# Patient Record
Sex: MALE | Race: WHITE | Employment: FULL TIME | URBAN - NONMETROPOLITAN AREA
[De-identification: names, ages, dates, MRNs, and addresses within clinical notes are randomized per-mention and may not be internally consistent; named-entity substitution may affect disease eponyms.]

---

## 2017-07-23 ENCOUNTER — OFFICE VISIT (OUTPATIENT)
Dept: PRIMARY CARE CLINIC | Age: 62
End: 2017-07-23
Payer: COMMERCIAL

## 2017-07-23 VITALS
HEART RATE: 73 BPM | WEIGHT: 167 LBS | OXYGEN SATURATION: 98 % | TEMPERATURE: 98.3 F | BODY MASS INDEX: 22.65 KG/M2 | DIASTOLIC BLOOD PRESSURE: 70 MMHG | SYSTOLIC BLOOD PRESSURE: 116 MMHG

## 2017-07-23 DIAGNOSIS — Z91.09 ENVIRONMENTAL ALLERGIES: ICD-10-CM

## 2017-07-23 DIAGNOSIS — J32.9 VIRAL SINUSITIS: Primary | ICD-10-CM

## 2017-07-23 DIAGNOSIS — I48.20 CHRONIC ATRIAL FIBRILLATION (HCC): ICD-10-CM

## 2017-07-23 DIAGNOSIS — B97.89 VIRAL SINUSITIS: Primary | ICD-10-CM

## 2017-07-23 PROCEDURE — 99214 OFFICE O/P EST MOD 30 MIN: CPT | Performed by: NURSE PRACTITIONER

## 2017-07-23 RX ORDER — PREDNISONE 50 MG/1
50 TABLET ORAL DAILY
Qty: 5 TABLET | Refills: 0 | Status: SHIPPED | OUTPATIENT
Start: 2017-07-23 | End: 2017-07-28

## 2017-07-23 ASSESSMENT — ENCOUNTER SYMPTOMS
SINUS PRESSURE: 1
SORE THROAT: 0
CHEST TIGHTNESS: 0
EYES NEGATIVE: 1
ABDOMINAL PAIN: 0
NAUSEA: 0
COUGH: 0
ALLERGIC/IMMUNOLOGIC NEGATIVE: 1
RESPIRATORY NEGATIVE: 1
GASTROINTESTINAL NEGATIVE: 1
RHINORRHEA: 0
VOMITING: 0
DIARRHEA: 0
CONSTIPATION: 0
TROUBLE SWALLOWING: 0
SHORTNESS OF BREATH: 0

## 2018-09-02 ENCOUNTER — HOSPITAL ENCOUNTER (OUTPATIENT)
Age: 63
Discharge: HOME | End: 2018-09-04
Payer: COMMERCIAL

## 2018-09-02 ENCOUNTER — OFFICE VISIT (OUTPATIENT)
Dept: PRIMARY CARE CLINIC | Age: 63
End: 2018-09-02
Payer: COMMERCIAL

## 2018-09-02 ENCOUNTER — HOSPITAL ENCOUNTER (OUTPATIENT)
Dept: GENERAL RADIOLOGY | Age: 63
Discharge: HOME | End: 2018-09-04
Payer: COMMERCIAL

## 2018-09-02 VITALS
SYSTOLIC BLOOD PRESSURE: 132 MMHG | RESPIRATION RATE: 16 BRPM | DIASTOLIC BLOOD PRESSURE: 80 MMHG | WEIGHT: 169 LBS | OXYGEN SATURATION: 98 % | HEART RATE: 68 BPM | TEMPERATURE: 97.6 F | BODY MASS INDEX: 22.89 KG/M2 | HEIGHT: 72 IN

## 2018-09-02 DIAGNOSIS — S83.92XA SPRAIN OF LEFT KNEE, UNSPECIFIED LIGAMENT, INITIAL ENCOUNTER: Primary | ICD-10-CM

## 2018-09-02 DIAGNOSIS — M25.562 ACUTE PAIN OF LEFT KNEE: ICD-10-CM

## 2018-09-02 PROCEDURE — G8427 DOCREV CUR MEDS BY ELIG CLIN: HCPCS | Performed by: FAMILY MEDICINE

## 2018-09-02 PROCEDURE — 1036F TOBACCO NON-USER: CPT | Performed by: FAMILY MEDICINE

## 2018-09-02 PROCEDURE — G8420 CALC BMI NORM PARAMETERS: HCPCS | Performed by: FAMILY MEDICINE

## 2018-09-02 PROCEDURE — 3017F COLORECTAL CA SCREEN DOC REV: CPT | Performed by: FAMILY MEDICINE

## 2018-09-02 PROCEDURE — 73562 X-RAY EXAM OF KNEE 3: CPT

## 2018-09-02 PROCEDURE — 99214 OFFICE O/P EST MOD 30 MIN: CPT | Performed by: FAMILY MEDICINE

## 2018-09-02 RX ORDER — ASPIRIN 325 MG
325 TABLET ORAL
COMMUNITY

## 2018-09-02 RX ORDER — IBUPROFEN 800 MG/1
800 TABLET ORAL EVERY 8 HOURS PRN
Qty: 60 TABLET | Refills: 0 | Status: SHIPPED | OUTPATIENT
Start: 2018-09-02

## 2018-09-02 ASSESSMENT — PATIENT HEALTH QUESTIONNAIRE - PHQ9
2. FEELING DOWN, DEPRESSED OR HOPELESS: 0
SUM OF ALL RESPONSES TO PHQ QUESTIONS 1-9: 0
1. LITTLE INTEREST OR PLEASURE IN DOING THINGS: 0
SUM OF ALL RESPONSES TO PHQ9 QUESTIONS 1 & 2: 0
SUM OF ALL RESPONSES TO PHQ QUESTIONS 1-9: 0

## 2018-09-12 ENCOUNTER — OFFICE VISIT (OUTPATIENT)
Dept: ORTHOPEDIC SURGERY | Age: 63
End: 2018-09-12
Payer: COMMERCIAL

## 2018-09-12 VITALS
DIASTOLIC BLOOD PRESSURE: 63 MMHG | HEIGHT: 72 IN | HEART RATE: 73 BPM | WEIGHT: 169 LBS | SYSTOLIC BLOOD PRESSURE: 130 MMHG | BODY MASS INDEX: 22.89 KG/M2

## 2018-09-12 DIAGNOSIS — M76.52 PATELLAR TENDINITIS OF LEFT KNEE: Primary | ICD-10-CM

## 2018-09-12 PROCEDURE — 1036F TOBACCO NON-USER: CPT | Performed by: FAMILY MEDICINE

## 2018-09-12 PROCEDURE — G8420 CALC BMI NORM PARAMETERS: HCPCS | Performed by: FAMILY MEDICINE

## 2018-09-12 PROCEDURE — 99203 OFFICE O/P NEW LOW 30 MIN: CPT | Performed by: FAMILY MEDICINE

## 2018-09-12 PROCEDURE — MISCD377 PATELLAR TENDON STRAP-BREG: Performed by: FAMILY MEDICINE

## 2018-09-12 PROCEDURE — G8427 DOCREV CUR MEDS BY ELIG CLIN: HCPCS | Performed by: FAMILY MEDICINE

## 2018-09-12 PROCEDURE — 3017F COLORECTAL CA SCREEN DOC REV: CPT | Performed by: FAMILY MEDICINE

## 2018-09-12 NOTE — PROGRESS NOTES
physical therapy does not wish to participate not just yet however we will get him a Cho-Pat strap for stability for the patellar tendon and see him back otherwise as needed if he does wish to participate in physical therapy we will gladly write him a prescription without him having coming back to see us    Return to clinic in No Follow-up on file. Rabia Beltran Please be aware portions of this note were completed using voice recognition software and unforeseen errors may have occurred    Electronically signed by Fredy Brothers DO, FAOASM  on 9/12/18 at 1:39 PM        No orders of the defined types were placed in this encounter.

## 2019-01-20 ENCOUNTER — OFFICE VISIT (OUTPATIENT)
Dept: PRIMARY CARE CLINIC | Age: 64
End: 2019-01-20
Payer: COMMERCIAL

## 2019-01-20 VITALS
TEMPERATURE: 96.9 F | HEIGHT: 72 IN | BODY MASS INDEX: 23.62 KG/M2 | OXYGEN SATURATION: 98 % | HEART RATE: 70 BPM | DIASTOLIC BLOOD PRESSURE: 88 MMHG | SYSTOLIC BLOOD PRESSURE: 132 MMHG | RESPIRATION RATE: 16 BRPM | WEIGHT: 174.4 LBS

## 2019-01-20 DIAGNOSIS — S61.012A LACERATION OF LEFT THUMB WITHOUT FOREIGN BODY WITHOUT DAMAGE TO NAIL, INITIAL ENCOUNTER: Primary | ICD-10-CM

## 2019-01-20 PROCEDURE — G8427 DOCREV CUR MEDS BY ELIG CLIN: HCPCS | Performed by: FAMILY MEDICINE

## 2019-01-20 PROCEDURE — G8420 CALC BMI NORM PARAMETERS: HCPCS | Performed by: FAMILY MEDICINE

## 2019-01-20 PROCEDURE — 1036F TOBACCO NON-USER: CPT | Performed by: FAMILY MEDICINE

## 2019-01-20 PROCEDURE — 99213 OFFICE O/P EST LOW 20 MIN: CPT | Performed by: FAMILY MEDICINE

## 2019-01-20 PROCEDURE — G8484 FLU IMMUNIZE NO ADMIN: HCPCS | Performed by: FAMILY MEDICINE

## 2019-01-20 PROCEDURE — 3017F COLORECTAL CA SCREEN DOC REV: CPT | Performed by: FAMILY MEDICINE

## 2019-01-20 RX ORDER — TRAMADOL HYDROCHLORIDE 50 MG/1
50 TABLET ORAL EVERY 8 HOURS PRN
Qty: 10 TABLET | Refills: 0 | Status: SHIPPED | OUTPATIENT
Start: 2019-01-20 | End: 2019-01-24

## 2019-01-20 RX ORDER — SULFAMETHOXAZOLE AND TRIMETHOPRIM 800; 160 MG/1; MG/1
1 TABLET ORAL 2 TIMES DAILY
Qty: 10 TABLET | Refills: 0 | Status: SHIPPED | OUTPATIENT
Start: 2019-01-20 | End: 2019-01-25

## 2019-01-20 RX ORDER — ACETAMINOPHEN 500 MG
500 TABLET ORAL EVERY 6 HOURS PRN
COMMUNITY
End: 2019-01-20 | Stop reason: ALTCHOICE

## 2019-01-20 ASSESSMENT — ENCOUNTER SYMPTOMS: COLOR CHANGE: 1

## 2019-09-19 ENCOUNTER — OFFICE VISIT (OUTPATIENT)
Dept: PRIMARY CARE CLINIC | Age: 64
End: 2019-09-19
Payer: COMMERCIAL

## 2019-09-19 VITALS
BODY MASS INDEX: 22.92 KG/M2 | OXYGEN SATURATION: 99 % | DIASTOLIC BLOOD PRESSURE: 80 MMHG | TEMPERATURE: 97.9 F | SYSTOLIC BLOOD PRESSURE: 138 MMHG | HEART RATE: 91 BPM | HEIGHT: 72 IN | WEIGHT: 169.2 LBS

## 2019-09-19 DIAGNOSIS — H65.01 RIGHT ACUTE SEROUS OTITIS MEDIA, RECURRENCE NOT SPECIFIED: Primary | ICD-10-CM

## 2019-09-19 PROCEDURE — 99213 OFFICE O/P EST LOW 20 MIN: CPT | Performed by: FAMILY MEDICINE

## 2019-09-19 RX ORDER — FLUTICASONE PROPIONATE 50 MCG
1 SPRAY, SUSPENSION (ML) NASAL DAILY
Qty: 1 BOTTLE | Refills: 0 | Status: SHIPPED | OUTPATIENT
Start: 2019-09-19 | End: 2022-03-03

## 2019-09-19 RX ORDER — AMOXICILLIN 875 MG/1
875 TABLET, COATED ORAL 2 TIMES DAILY
Qty: 20 TABLET | Refills: 0 | Status: SHIPPED | OUTPATIENT
Start: 2019-09-19 | End: 2019-09-29

## 2019-09-19 ASSESSMENT — PATIENT HEALTH QUESTIONNAIRE - PHQ9
SUM OF ALL RESPONSES TO PHQ QUESTIONS 1-9: 0
SUM OF ALL RESPONSES TO PHQ QUESTIONS 1-9: 0
1. LITTLE INTEREST OR PLEASURE IN DOING THINGS: 0
SUM OF ALL RESPONSES TO PHQ9 QUESTIONS 1 & 2: 0
2. FEELING DOWN, DEPRESSED OR HOPELESS: 0

## 2019-09-19 NOTE — PROGRESS NOTES
light reflex. The external auditory canals are clear bilaterally. Oropharynx has no erythema. There is no exudate. There is no tenderness over the frontal and maxillary sinuses bilaterally. Neck supple. No adenopathy. Chest:  Normal expansion. Clear to auscultation. No rales, rhonchi, or wheezing. Heart sounds are normal.  Regular rate and rhythm without murmur, gallop or rub. Assessment & Plan:    Right acute serous otitis media, recurrence not specified  - fluticasone (FLONASE) 50 MCG/ACT nasal spray; 1 spray by Nasal route daily  Dispense: 1 Bottle; Refill: 0  - amoxicillin (AMOXIL) 875 MG tablet; Take 1 tablet by mouth 2 times daily for 10 days  Dispense: 20 tablet; Refill: 0    Printed information regarding Eustachian Tube Problems was provided to the patient with his after visit summary. He was advised to follow up if symptoms worsen or do not resolve.        (Please note that portions of this note were completed with a voice-recognition program. Efforts were made to edit the dictation but occasionally words are mis-transcribed.)

## 2019-09-27 ENCOUNTER — OFFICE VISIT (OUTPATIENT)
Dept: PRIMARY CARE CLINIC | Age: 64
End: 2019-09-27
Payer: COMMERCIAL

## 2019-09-27 VITALS
BODY MASS INDEX: 23.43 KG/M2 | RESPIRATION RATE: 16 BRPM | HEART RATE: 66 BPM | TEMPERATURE: 96.9 F | DIASTOLIC BLOOD PRESSURE: 90 MMHG | HEIGHT: 72 IN | SYSTOLIC BLOOD PRESSURE: 136 MMHG | OXYGEN SATURATION: 98 % | WEIGHT: 173 LBS

## 2019-09-27 DIAGNOSIS — J01.41 ACUTE RECURRENT PANSINUSITIS: Primary | ICD-10-CM

## 2019-09-27 DIAGNOSIS — H66.004 RECURRENT ACUTE SUPPURATIVE OTITIS MEDIA OF RIGHT EAR WITHOUT SPONTANEOUS RUPTURE OF TYMPANIC MEMBRANE: ICD-10-CM

## 2019-09-27 PROCEDURE — 99213 OFFICE O/P EST LOW 20 MIN: CPT | Performed by: PHYSICIAN ASSISTANT

## 2019-09-27 RX ORDER — CIPROFLOXACIN HYDROCHLORIDE 3.5 MG/ML
SOLUTION/ DROPS TOPICAL
Qty: 5 ML | Refills: 0 | Status: SHIPPED | OUTPATIENT
Start: 2019-09-27 | End: 2022-03-03

## 2019-09-27 RX ORDER — CEFDINIR 300 MG/1
300 CAPSULE ORAL 2 TIMES DAILY
Qty: 20 CAPSULE | Refills: 0 | Status: SHIPPED | OUTPATIENT
Start: 2019-09-27 | End: 2019-10-07

## 2019-09-27 ASSESSMENT — ENCOUNTER SYMPTOMS
RESPIRATORY NEGATIVE: 1
SORE THROAT: 0
TROUBLE SWALLOWING: 0
GASTROINTESTINAL NEGATIVE: 1

## 2022-03-03 ENCOUNTER — OFFICE VISIT (OUTPATIENT)
Dept: PRIMARY CARE CLINIC | Age: 67
End: 2022-03-03
Payer: COMMERCIAL

## 2022-03-03 VITALS
TEMPERATURE: 97.1 F | HEART RATE: 83 BPM | WEIGHT: 172.5 LBS | SYSTOLIC BLOOD PRESSURE: 124 MMHG | HEIGHT: 72 IN | OXYGEN SATURATION: 98 % | DIASTOLIC BLOOD PRESSURE: 82 MMHG | BODY MASS INDEX: 23.36 KG/M2 | RESPIRATION RATE: 18 BRPM

## 2022-03-03 DIAGNOSIS — R05.9 COUGH: ICD-10-CM

## 2022-03-03 DIAGNOSIS — J01.40 ACUTE PANSINUSITIS, RECURRENCE NOT SPECIFIED: Primary | ICD-10-CM

## 2022-03-03 PROCEDURE — 99212 OFFICE O/P EST SF 10 MIN: CPT | Performed by: FAMILY MEDICINE

## 2022-03-03 PROCEDURE — 99213 OFFICE O/P EST LOW 20 MIN: CPT | Performed by: FAMILY MEDICINE

## 2022-03-03 RX ORDER — CEFDINIR 300 MG/1
300 CAPSULE ORAL 2 TIMES DAILY
Qty: 20 CAPSULE | Refills: 0 | Status: SHIPPED | OUTPATIENT
Start: 2022-03-03 | End: 2022-03-13

## 2022-03-03 RX ORDER — BENZONATATE 100 MG/1
100-200 CAPSULE ORAL 3 TIMES DAILY PRN
Qty: 30 CAPSULE | Refills: 0 | Status: SHIPPED | OUTPATIENT
Start: 2022-03-03 | End: 2022-03-10

## 2022-03-03 RX ORDER — M-VIT,TX,IRON,MINS/CALC/FOLIC 27MG-0.4MG
1 TABLET ORAL DAILY
COMMUNITY

## 2022-03-03 ASSESSMENT — PATIENT HEALTH QUESTIONNAIRE - PHQ9
SUM OF ALL RESPONSES TO PHQ QUESTIONS 1-9: 0
SUM OF ALL RESPONSES TO PHQ9 QUESTIONS 1 & 2: 0
2. FEELING DOWN, DEPRESSED OR HOPELESS: 0
1. LITTLE INTEREST OR PLEASURE IN DOING THINGS: 0
SUM OF ALL RESPONSES TO PHQ QUESTIONS 1-9: 0

## 2022-03-03 NOTE — PROGRESS NOTES
[facility]             [address] [facility], [place], [address]                        Telephone [phone]             Fax [phone]       Richard Salinas  :  1955  Age:  61 y.o. MRN:  [id number]  Date of visit:  3/3/2022       Assessment & Plan:    1. Acute pansinusitis, recurrence not specified  I discussed with the patient that Covid-19 testing is unlikely to be beneficial, as he is outside of the timeframe for antiviral treatment, and he is at the end of the timeframe for infectivity. As his symptoms have persisted for 10 days, a bacterial infection is likely. Omnicef was prescribed:  - cefdinir (OMNICEF) 300 MG capsule; Take 1 capsule by mouth 2 times daily for 10 days  Dispense: 20 capsule; Refill: 0    2. Cough  - benzonatate (TESSALON) 100 MG capsule; Take 1-2 capsules by mouth 3 times daily as needed for Cough  Dispense: 30 capsule; Refill: 0    He was advised to follow up if symptoms worsen or do not resolve. Subjective:    Richard Salinas is a 61 y.o. male who presents to [facility] today (3/3/2022) for evaluation of:  Cough (Started 12 days ago. Headaches, generalized weakness, fatigue, body aches, nasal drainage greenish/yellowish. )      He states that he has had sinus congestion and cough for about 10 days. He denies fever. He reports fatigue and body aches. He states that the cough is worse at night. He states that the cough is productive at times. He denies contact with anyone who has been ill that he is aware of. He has used over the counter medication without any significant improvement in symptoms. He has had three doses of the Pfizer Covid-19 vaccine.       Current medications are:  Current Outpatient Medications   Medication Sig Dispense Refill    Multiple Vitamins-Minerals (THERAPEUTIC MULTIVITAMIN-MINERALS) tablet Take 1 tablet by mouth daily      aspirin 325 MG tablet Take 325 mg by mouth      ibuprofen (ADVIL;MOTRIN) 800 MG tablet Take 1 tablet by mouth every 8 hours as needed for Pain Take one po Q 8 hours prn pain. Take with food. 60 tablet 0    cetirizine (ZYRTEC ALLERGY) 10 MG tablet Take 1 tablet by mouth daily 30 tablet 0     No current facility-administered medications for this visit. He has No Known Allergies. He has the following problem list:  There is no problem list on file for this patient. He  reports that he has never smoked. He has never used smokeless tobacco.      Objective:    Vitals:    03/03/22 0816   BP: 124/82   Site: Left Upper Arm   Position: Sitting   Cuff Size: Medium Adult   Pulse: 83   Resp: 18   Temp: 97.1 °F (36.2 °C)   TempSrc: Tympanic   SpO2: 98%   Weight: 172 lb 8 oz (78.2 kg)   Height: 6' (1.829 m)      SpO2: 98 %       Body mass index is 23.4 kg/m². Well-nourished, well-developed male healthy-appearing, alert and cooperative. The tympanic membranes are clear bilaterally. Oropharynx has no erythema. There is no exudate. There is no tenderness over the frontal and maxillary sinuses bilaterally. Neck supple. No adenopathy. Chest:  Normal expansion. Clear to auscultation. No rales, rhonchi, or wheezing. Respirations are not labored. Heart sounds are normal.  Regular rate and rhythm without murmur, gallop or rub.           (Please note that portions of this note were completed with a voice-recognition program. Efforts were made to edit the dictation but occasionally words are mis-transcribed.)

## 2022-03-26 ENCOUNTER — OFFICE VISIT (OUTPATIENT)
Dept: PRIMARY CARE CLINIC | Age: 67
End: 2022-03-26
Payer: COMMERCIAL

## 2022-03-26 VITALS
HEIGHT: 78 IN | OXYGEN SATURATION: 98 % | TEMPERATURE: 97.2 F | DIASTOLIC BLOOD PRESSURE: 80 MMHG | SYSTOLIC BLOOD PRESSURE: 142 MMHG | BODY MASS INDEX: 19.78 KG/M2 | WEIGHT: 171 LBS | HEART RATE: 104 BPM

## 2022-03-26 DIAGNOSIS — B96.89 ACUTE BACTERIAL SINUSITIS: ICD-10-CM

## 2022-03-26 DIAGNOSIS — R51.9 SINUS HEADACHE: ICD-10-CM

## 2022-03-26 DIAGNOSIS — R05.9 COUGH: ICD-10-CM

## 2022-03-26 DIAGNOSIS — I48.20 CHRONIC ATRIAL FIBRILLATION (HCC): Primary | ICD-10-CM

## 2022-03-26 DIAGNOSIS — J01.90 ACUTE BACTERIAL SINUSITIS: ICD-10-CM

## 2022-03-26 PROCEDURE — 99212 OFFICE O/P EST SF 10 MIN: CPT | Performed by: NURSE PRACTITIONER

## 2022-03-26 PROCEDURE — 99214 OFFICE O/P EST MOD 30 MIN: CPT | Performed by: NURSE PRACTITIONER

## 2022-03-26 RX ORDER — PREDNISONE 20 MG/1
20 TABLET ORAL 2 TIMES DAILY
Qty: 10 TABLET | Refills: 0 | Status: SHIPPED | OUTPATIENT
Start: 2022-03-26 | End: 2022-03-31

## 2022-03-26 RX ORDER — FLUTICASONE PROPIONATE 50 MCG
1 SPRAY, SUSPENSION (ML) NASAL 2 TIMES DAILY
Qty: 16 G | Refills: 0 | Status: SHIPPED | OUTPATIENT
Start: 2022-03-26 | End: 2022-11-02 | Stop reason: ALTCHOICE

## 2022-03-26 RX ORDER — AMOXICILLIN AND CLAVULANATE POTASSIUM 875; 125 MG/1; MG/1
1 TABLET, FILM COATED ORAL 2 TIMES DAILY
Qty: 20 TABLET | Refills: 0 | Status: SHIPPED | OUTPATIENT
Start: 2022-03-26 | End: 2022-04-05

## 2022-03-26 ASSESSMENT — PATIENT HEALTH QUESTIONNAIRE - PHQ9
SUM OF ALL RESPONSES TO PHQ9 QUESTIONS 1 & 2: 0
SUM OF ALL RESPONSES TO PHQ QUESTIONS 1-9: 0
SUM OF ALL RESPONSES TO PHQ QUESTIONS 1-9: 0
2. FEELING DOWN, DEPRESSED OR HOPELESS: 0
SUM OF ALL RESPONSES TO PHQ QUESTIONS 1-9: 0
SUM OF ALL RESPONSES TO PHQ QUESTIONS 1-9: 0
1. LITTLE INTEREST OR PLEASURE IN DOING THINGS: 0

## 2022-03-26 ASSESSMENT — ENCOUNTER SYMPTOMS
ALLERGIC/IMMUNOLOGIC NEGATIVE: 1
SORE THROAT: 1
CONSTIPATION: 0
RHINORRHEA: 1
SINUS PRESSURE: 1
EYES NEGATIVE: 1
VOMITING: 0
COUGH: 1
DIARRHEA: 0
NAUSEA: 0
ABDOMINAL PAIN: 0

## 2022-03-26 NOTE — PATIENT INSTRUCTIONS
Switch Zyrtec to Claritin and add Flonase for sinus drainage. Follow up with Cardiology regarding Atrial Fibrillation. Patient Education        Learning About Atrial Fibrillation  What is atrial fibrillation? Atrial fibrillation (say \"AY-tree-keaton encarnacionfmi-zcas-GBD-sunshine\") is a common type of irregular heartbeat (arrhythmia). Normally, the heart beats in a strong, steady rhythm. In atrial fibrillation, a problem with the heart's electrical system causes the two upper chambers of the heart (called the atria) to quiver, or fibrillate. Atrial fibrillation can be dangerous. This is because if the heartbeat isn't strong and steady, blood can collect, or pool, in the atria. And pooled blood is more likely to form clots. Clots can travel to the brain, block blood flow, and cause a stroke. Atrial fibrillation can also lead to heart failure. This condition also upsets the normal rhythm between the atria and the lower chambers of the heart. (These chambers are called the ventricles.) The ventricles may beat fast and without a regular rhythm. What are the symptoms? Some people feel symptoms when they have episodes of atrial fibrillation. But other people don't notice any symptoms. If you have symptoms, you may feel:  · A fluttering, racing, or pounding feeling in your chest called palpitations. · Weak or tired. · Dizzy or lightheaded. · Short of breath. · Chest pain. · Confused. You may notice signs of atrial fibrillation when you check your pulse. Your pulse may seem uneven or fast.  What can you expect when you have atrial fibrillation? At first, spells of atrial fibrillation may come on suddenly and last a short time. They may go away on their own or with treatment. Over time, the spells may last longer and occur more often. They often don't go away on their own. How is it treated? Treatments can help you feel better and prevent future problems, especially stroke and heart failure.   Your treatment will depend on the cause of your atrial fibrillation, your symptoms, and your risk for stroke. Types of treatment include:  · Heart rate treatment. Medicine may be used to slow your heart rate. Your heartbeat may still be irregular. But these medicines keep your heart from beating too fast. They may also help relieve symptoms. · Heart rhythm treatment. Different treatments may be used to try to stop atrial fibrillation and keep it from returning. They can also relieve symptoms. These treatments include medicine, electrical cardioversion to shock the heart back to a normal rhythm, a procedure called catheter ablation, and heart surgery. · Stroke prevention. You and your doctor can decide how to lower your risk. You may decide to take a blood-thinning medicine called an anticoagulant. What is a heart-healthy lifestyle for atrial fibrillation? You can live well and help manage atrial fibrillation by having a heart-healthy lifestyle. This lifestyle may help reduce how often you have episodes of atrial fibrillation. Don't smoke. Avoid secondhand smoke too. Quitting smoking is the best thing you can do for your heart. Be active. Talk to your doctor about what type and level of exercise is safe for you. Eat a heart-healthy diet. These foods include vegetables, fruits, nuts, beans, lean meat, fish, and whole grains. Limit sodium, alcohol, and sugar. Avoid alcohol if it triggers symptoms. Stay at a healthy weight. Lose weight if you need to. Losing weight can help relieve symptoms. Manage other health problems. These problems include diabetes, high blood pressure, and high cholesterol. If you think you may have a problem with alcohol or drug use, talk to your doctor. Manage stress. Options like yoga, biofeedback, and meditation may help. Where can you learn more? Go to https://chelsi.[place]. org and sign in to your [employer] account.  Enter M209 in the [employer] box to learn more about \"Learning About Atrial Fibrillation. \"     If you do not have an account, please click on the \"Sign Up Now\" link. Current as of: April 29, 2021               Content Version: 13.1  © 9142-2461 [employer]. Care instructions adapted under license by [facility] ([facility]). If you have questions about a medical condition or this instruction, always ask your healthcare professional. [place] [address] any warranty or liability for your use of this information. Patient Education        Deciding Between Electrical Cardioversion and Rate Control Medicines for Atrial Fibrillation  How can you decide between electrical cardioversion and rate control medicines for atrial fibrillation? What is atrial fibrillation? Atrial fibrillation (say \"AY-tree-keaton qso-dlmy-KXH-shun\") is a kind of uneven heartbeat. It can make you feel lightheaded and dizzy. You may feel weak. It also can make you more likely to have a stroke. Electrical cardioversion can return your heart to a normal rhythm. First you'll get medicines to make you sleepy and control pain. Then your doctor will use patches to send an electric current to your heart. This resets the rhythm of your heart. Not everyone with atrial fibrillation needs this treatment. For some people, taking medicines may be better. Most people can live with an uneven heartbeat. It just has to be kept under control so the heart does not beat too fast.  Use this information to help you and your doctor decide which treatment to choose for atrial fibrillation. What are jones points about this decision? · Electrical cardioversion can return your heart to a normal rhythm. But the problem can come back. The longer you have had atrial fibrillation, the more likely it is to come back after this treatment. · Cardioversion may not work as well when an uneven heartbeat is caused by another heart disease, such as heart failure.   · If your symptoms bother you a lot, you may want to try cardioversion. But even if it works, you may still need to take blood thinners to prevent a stroke. · If you don't have symptoms, or if they don't bother you much, you can try medicines to slow your heart rate. And you can take blood thinners to prevent a stroke. · Cardioversion does have risks, such as stroke. Discuss the risks with your doctor. Make sure you understand them. · You may have more than one heart problem. Cardioversion doesn't work as well if you have more than one heart problem. Why might you choose electrical cardioversion? · It restores the normal heart rhythm for most people. · The idea of having an electric shock does not bother you. · Your symptoms bother you a lot. · You have had atrial fibrillation just one time. · You do not have other heart problems. · You may not have to take as many medicines. Or you may not need to take them as long. Why might you choose rate-control medicines? · These medicines keep many people from having symptoms. · You prefer to take medicines rather than have an electric shock. · Your symptoms don't bother you much. · If these medicines don't work, you can still try electrical cardioversion. Your decision  Thinking about the facts and your feelings can help you make a decision that is right for you. Be sure you understand the benefits and risks of your options. And think about what else you need to do before you make the decision. Where can you learn more? Go to https://[place].comavelino.Infotop. org and sign in to your [employer] account. Enter M037 in the [facility] box to learn more about \"Deciding Between Electrical Cardioversion and Rate Control Medicines for Atrial Fibrillation. \"     If you do not have an account, please click on the \"Sign Up Now\" link. Current as of: April 29, 2021               Content Version: 13.1  © 0615-6337 Healthwise, Incorporated.    Care instructions adapted under license by [facility] ([facility]). If you have questions about a medical condition or this instruction, always ask your healthcare professional. [place] [address] any warranty or liability for your use of this information.

## 2022-03-26 NOTE — PROGRESS NOTES
[address] [facility] A department of [facility], [place], operated by [facility]  Phone: [phone]  Fax: [phone]      Carolyn Pérez is a 61 y.o. male who presents to the [facility] today for his medical conditions/complaints as noted below. Carolyn Pérez is c/o of Congestion (sinus congestion,weak, body aches, headache,cough. hx of sinus infection 3 weeks ago and had medcations prescription and now t is back.)          HPI:     History of irregular heart rhythm about 15 years ago. Reports that he was cleaning electronic stove and received shock. Has had Afib since. Not on anticoagulants as a personal choice. Not on any cardiac medications either. HR increased when ill and more fatigued. Was treated with antibiotics 3 weeks ago for sinus infection and initially better. Started having symptoms 3 days ago again. Sinusitis  This is a recurrent problem. Episode onset: omnicef 3 weeks ago and got better and now worse 3 days ago. The problem has been gradually worsening since onset. There has been no fever. He is experiencing no pain. Associated symptoms include congestion, coughing (mild), headaches (frontal), sinus pressure, sneezing and a sore throat. Past treatments include antibiotics (zyrtec). The treatment provided mild relief. History reviewed. No pertinent past medical history.      No Known Allergies    Wt Readings from Last 3 Encounters:   03/26/22 171 lb (77.6 kg)   03/03/22 172 lb 8 oz (78.2 kg)   09/27/19 173 lb (78.5 kg)     BP Readings from Last 3 Encounters:   03/26/22 (!) 142/80   03/03/22 124/82   09/27/19 (!) 136/90      Temp Readings from Last 3 Encounters:   03/26/22 97.2 °F (36.2 °C) (Tympanic)   03/03/22 97.1 °F (36.2 °C) (Tympanic)   09/27/19 96.9 °F (36.1 °C)     Pulse Readings from Last 3 Encounters:   03/26/22 104   03/03/22 83   09/27/19 66     SpO2 Readings from Last 3 Encounters:   03/26/22 98%   03/03/22 98%   09/27/19 98% Subjective:      Review of Systems   Constitutional: Positive for fatigue. Negative for activity change and appetite change. HENT: Positive for congestion, postnasal drip, rhinorrhea, sinus pressure, sneezing and sore throat. Eyes: Negative. Respiratory: Positive for cough (mild). Cardiovascular: Negative for chest pain. Gastrointestinal: Negative for abdominal pain, constipation, diarrhea, nausea and vomiting. Endocrine: Negative. Genitourinary: Negative. Negative for difficulty urinating and dysuria. Musculoskeletal: Negative. Skin: Negative. Negative for rash. Allergic/Immunologic: Negative. Neurological: Positive for headaches (frontal). Hematological: Negative. Psychiatric/Behavioral: Negative. All other systems reviewed and are negative. Objective:     Vitals:    03/26/22 0902   BP: (!) 142/80   Site: Left Upper Arm   Position: Sitting   Cuff Size: Medium Adult   Pulse: 104   Temp: 97.2 °F (36.2 °C)   TempSrc: Tympanic   SpO2: 98%   Weight: 171 lb (77.6 kg)   Height: 6' 7\" (2.007 m)     Body mass index is 19.26 kg/m². BP (!) 142/80 (Site: Left Upper Arm, Position: Sitting, Cuff Size: Medium Adult)   Pulse 104   Temp 97.2 °F (36.2 °C) (Tympanic)   Ht 6' 7\" (2.007 m)   Wt 171 lb (77.6 kg)   SpO2 98%   BMI 19.26 kg/m²   Physical Exam  Vitals and nursing note reviewed. Constitutional:       General: He is not in acute distress. Appearance: He is ill-appearing. HENT:      Right Ear: Hearing, tympanic membrane, ear canal and external ear normal. There is no impacted cerumen. Left Ear: Hearing, tympanic membrane, ear canal and external ear normal. There is no impacted cerumen. Nose: Mucosal edema, congestion and rhinorrhea present. Right Turbinates: Swollen. Left Turbinates: Swollen. Mouth/Throat:      Lips: Pink. Mouth: Mucous membranes are moist.      Pharynx: Uvula midline. Posterior oropharyngeal erythema present. Tonsils: No tonsillar abscesses. Comments: Moderate amount of mucus noted in the pharynx. Eyes:      Conjunctiva/sclera: Conjunctivae normal.      Pupils: Pupils are equal, round, and reactive to light. Cardiovascular:      Rate and Rhythm: Normal rate. Rhythm irregular. Pulses: Normal pulses. Heart sounds: Normal heart sounds. No murmur heard. No friction rub. No gallop. Pulmonary:      Effort: Pulmonary effort is normal. No respiratory distress. Breath sounds: Normal breath sounds. No decreased air movement. No decreased breath sounds, wheezing, rhonchi or rales. Chest:   Breasts:      Right: No supraclavicular adenopathy. Left: No supraclavicular adenopathy. Abdominal:      General: Abdomen is flat. Bowel sounds are normal.   Musculoskeletal:         General: Normal range of motion. Cervical back: Normal range of motion. Lymphadenopathy:      Cervical: No cervical adenopathy. Right cervical: No superficial or posterior cervical adenopathy. Left cervical: No superficial or posterior cervical adenopathy. Upper Body:      Right upper body: No supraclavicular adenopathy. Left upper body: No supraclavicular adenopathy. Skin:     General: Skin is warm and dry. Capillary Refill: Capillary refill takes less than 2 seconds. Neurological:      General: No focal deficit present. Mental Status: He is alert and oriented to person, place, and time. Mental status is at baseline. Psychiatric:         Mood and Affect: Mood normal.         Behavior: Behavior normal.         Judgment: Judgment normal.         Assessment:      Diagnosis Orders   1. Chronic atrial fibrillation [facility])  Carlos Waggoner MD, Cardiology, [place]   2. Acute bacterial sinusitis  amoxicillin-clavulanate (AUGMENTIN) 875-125 MG per tablet    predniSONE (DELTASONE) 20 MG tablet    fluticasone (FLONASE) 50 MCG/ACT nasal spray   3. Cough     4.  Sinus headache amoxicillin-clavulanate (AUGMENTIN) 875-125 MG per tablet    predniSONE (DELTASONE) 20 MG tablet    fluticasone (FLONASE) 50 MCG/ACT nasal spray       Plan:   Long discussion with patient regarding Afib. He has had a history of afib for 15 years and has chosen not to be on anticoagulants. Agreeable to Cardiology referral to discuss [facility] and possible cardioversion. Will treat bacterial sinus infection with Augmentin BID for 10 days and also use Prednisone for 5 days. Recommended switching to Claritin from Zyrtec and add Flonase twice daily. Discussed exam, POCT findings, plan of care (including prescriptive and supportive as listed below) and follow-up at length with patient. Reviewed all prescribed and recommended medications, administration and side effects. Encouraged to return to the clinic for no improvement and or worsening of symptoms. Patient instructed to go to ER or call 911 if any difficulty breathing, shortness of breath, inability to swallow, hives or temp greater than 103 degrees. All questions were answered and they verbalized understanding and were agreeable with the plan. Return if symptoms worsen or fail to improve.         Electronically signed by EARNEST Jeter CNP on 3/26/2022 at 9:29 AM

## 2022-11-02 ENCOUNTER — OFFICE VISIT (OUTPATIENT)
Dept: FAMILY MEDICINE CLINIC | Age: 67
End: 2022-11-02
Payer: MEDICARE

## 2022-11-02 VITALS
HEART RATE: 98 BPM | SYSTOLIC BLOOD PRESSURE: 132 MMHG | BODY MASS INDEX: 23.11 KG/M2 | HEIGHT: 72 IN | DIASTOLIC BLOOD PRESSURE: 84 MMHG | OXYGEN SATURATION: 98 % | TEMPERATURE: 99.1 F | WEIGHT: 170.6 LBS | RESPIRATION RATE: 16 BRPM

## 2022-11-02 DIAGNOSIS — R09.81 CONGESTION OF NASAL SINUS: ICD-10-CM

## 2022-11-02 DIAGNOSIS — J01.90 ACUTE BACTERIAL SINUSITIS: Primary | ICD-10-CM

## 2022-11-02 DIAGNOSIS — M79.10 MYALGIA: ICD-10-CM

## 2022-11-02 DIAGNOSIS — R05.8 OTHER COUGH: ICD-10-CM

## 2022-11-02 DIAGNOSIS — J06.9 VIRAL UPPER RESPIRATORY ILLNESS: ICD-10-CM

## 2022-11-02 DIAGNOSIS — B96.89 ACUTE BACTERIAL SINUSITIS: Primary | ICD-10-CM

## 2022-11-02 PROCEDURE — 3017F COLORECTAL CA SCREEN DOC REV: CPT | Performed by: NURSE PRACTITIONER

## 2022-11-02 PROCEDURE — G8420 CALC BMI NORM PARAMETERS: HCPCS | Performed by: NURSE PRACTITIONER

## 2022-11-02 PROCEDURE — 99213 OFFICE O/P EST LOW 20 MIN: CPT | Performed by: NURSE PRACTITIONER

## 2022-11-02 PROCEDURE — 99211 OFF/OP EST MAY X REQ PHY/QHP: CPT | Performed by: NURSE PRACTITIONER

## 2022-11-02 PROCEDURE — 1036F TOBACCO NON-USER: CPT | Performed by: NURSE PRACTITIONER

## 2022-11-02 PROCEDURE — 1123F ACP DISCUSS/DSCN MKR DOCD: CPT | Performed by: NURSE PRACTITIONER

## 2022-11-02 PROCEDURE — PBSHW PBB SHADOW CHARGE: Performed by: NURSE PRACTITIONER

## 2022-11-02 PROCEDURE — G8484 FLU IMMUNIZE NO ADMIN: HCPCS | Performed by: NURSE PRACTITIONER

## 2022-11-02 PROCEDURE — G8427 DOCREV CUR MEDS BY ELIG CLIN: HCPCS | Performed by: NURSE PRACTITIONER

## 2022-11-02 RX ORDER — AMOXICILLIN AND CLAVULANATE POTASSIUM 875; 125 MG/1; MG/1
1 TABLET, FILM COATED ORAL 2 TIMES DAILY
Qty: 20 TABLET | Refills: 0 | Status: SHIPPED | OUTPATIENT
Start: 2022-11-02 | End: 2022-11-12

## 2022-11-02 RX ORDER — PREDNISONE 20 MG/1
20 TABLET ORAL 2 TIMES DAILY
Qty: 10 TABLET | Refills: 0 | Status: SHIPPED | OUTPATIENT
Start: 2022-11-02 | End: 2022-11-07

## 2022-11-02 RX ORDER — FLUTICASONE PROPIONATE 50 MCG
1 SPRAY, SUSPENSION (ML) NASAL 2 TIMES DAILY
Qty: 16 G | Refills: 0 | Status: SHIPPED | OUTPATIENT
Start: 2022-11-02

## 2022-11-02 SDOH — ECONOMIC STABILITY: FOOD INSECURITY: WITHIN THE PAST 12 MONTHS, YOU WORRIED THAT YOUR FOOD WOULD RUN OUT BEFORE YOU GOT MONEY TO BUY MORE.: NEVER TRUE

## 2022-11-02 SDOH — ECONOMIC STABILITY: FOOD INSECURITY: WITHIN THE PAST 12 MONTHS, THE FOOD YOU BOUGHT JUST DIDN'T LAST AND YOU DIDN'T HAVE MONEY TO GET MORE.: NEVER TRUE

## 2022-11-02 ASSESSMENT — ENCOUNTER SYMPTOMS
EYES NEGATIVE: 1
SINUS PAIN: 1
RHINORRHEA: 1
CONSTIPATION: 0
COUGH: 1
VOMITING: 0
SORE THROAT: 1
NAUSEA: 0
ABDOMINAL PAIN: 0
DIARRHEA: 0
ALLERGIC/IMMUNOLOGIC NEGATIVE: 1

## 2022-11-02 ASSESSMENT — SOCIAL DETERMINANTS OF HEALTH (SDOH): HOW HARD IS IT FOR YOU TO PAY FOR THE VERY BASICS LIKE FOOD, HOUSING, MEDICAL CARE, AND HEATING?: NOT HARD AT ALL

## 2023-01-23 NOTE — PROGRESS NOTES
[address] [facility]  [address] [address]  Phone: [phone]  Fax: [phone]      Jose Rosado is a 74 y.o. male who presents to the [address] [facility] or [facility] clinic today for his medical conditions/complaints as noted below. Jose Rosado is c/o of URI (Pt presents to walk in with complaints of sinus pressure, congestion, cough, headaches, and body aches since Sunday)      HPI:     URI   This is a new problem. The current episode started in the past 7 days (Manoj 10/30/2022). The problem has been gradually worsening. Associated symptoms include congestion, coughing (slight), headaches, rhinorrhea, sinus pain and a sore throat. Pertinent negatives include no abdominal pain, chest pain, diarrhea, dysuria, nausea, rash or vomiting. He has tried acetaminophen for the symptoms. The treatment provided mild relief. Past Medical History: History reviewed. No pertinent past medical history. Past Surgical History:  has a past surgical history that includes Lumbar spine surgery (1987). Allergies: No Known Allergies      Social History:  reports that he has never smoked. He has never used smokeless tobacco. He reports that he does not drink alcohol and does not use drugs.      Wt Readings from Last 3 Encounters:   11/02/22 170 lb 9.6 oz (77.4 kg)   03/26/22 171 lb (77.6 kg)   03/03/22 172 lb 8 oz (78.2 kg)     BP Readings from Last 3 Encounters:   11/02/22 132/84   03/26/22 (!) 142/80   03/03/22 124/82      Temp Readings from Last 3 Encounters:   11/02/22 99.1 °F (37.3 °C) (Tympanic)   03/26/22 97.2 °F (36.2 °C) (Tympanic)   03/03/22 97.1 °F (36.2 °C) (Tympanic)     Pulse Readings from Last 3 Encounters:   11/02/22 98   03/26/22 104   03/03/22 83     SpO2 Readings from Last 3 Encounters:   11/02/22 98%   03/26/22 98%   03/03/22 98%       Subjective:      Review of Systems   Constitutional:  Positive for appetite change and fatigue. Negative for activity change. HENT:  Positive for congestion, postnasal drip, rhinorrhea, sinus pain and sore throat. Eyes: Negative. Respiratory:  Positive for cough (slight). Cardiovascular:  Negative for chest pain. Gastrointestinal:  Negative for abdominal pain, constipation, diarrhea, nausea and vomiting. Endocrine: Negative. Genitourinary: Negative. Negative for difficulty urinating and dysuria. Skin: Negative. Negative for rash. Allergic/Immunologic: Negative. Neurological:  Positive for headaches. Hematological: Negative. Psychiatric/Behavioral: Negative. All other systems reviewed and are negative. Objective:     Vitals:    11/02/22 0916   BP: 132/84   Site: Left Upper Arm   Position: Sitting   Cuff Size: Medium Adult   Pulse: 98   Resp: 16   Temp: 99.1 °F (37.3 °C)   TempSrc: Tympanic   SpO2: 98%   Weight: 170 lb 9.6 oz (77.4 kg)   Height: 6' (1.829 m)     Body mass index is 23.14 kg/m². /84 (Site: Left Upper Arm, Position: Sitting, Cuff Size: Medium Adult)   Pulse 98   Temp 99.1 °F (37.3 °C) (Tympanic)   Resp 16   Ht 6' (1.829 m)   Wt 170 lb 9.6 oz (77.4 kg)   SpO2 98%   BMI 23.14 kg/m²   Physical Exam  Vitals and nursing note reviewed. Constitutional:       General: He is not in acute distress. Appearance: He is ill-appearing. HENT:      Right Ear: Hearing, tympanic membrane, ear canal and external ear normal. There is no impacted cerumen. Left Ear: Hearing, tympanic membrane, ear canal and external ear normal. There is no impacted cerumen. Nose: Mucosal edema, congestion and rhinorrhea present. Right Turbinates: Swollen. Left Turbinates: Swollen. Mouth/Throat:      Lips: Pink. Mouth: Mucous membranes are moist.      Pharynx: Uvula midline. Posterior oropharyngeal erythema present. Tonsils: No tonsillar abscesses. Comments: Moderate amount of mucus noted in the pharynx.   Eyes: Conjunctiva/sclera: Conjunctivae normal.      Pupils: Pupils are equal, round, and reactive to light. Cardiovascular:      Rate and Rhythm: Normal rate and regular rhythm. Pulses: Normal pulses. Heart sounds: Normal heart sounds. Pulmonary:      Effort: Pulmonary effort is normal. No respiratory distress. Breath sounds: Normal breath sounds. No decreased air movement. No decreased breath sounds, wheezing, rhonchi or rales. Musculoskeletal:         General: Normal range of motion. Cervical back: Normal range of motion. Lymphadenopathy:      Cervical: No cervical adenopathy. Right cervical: No superficial or posterior cervical adenopathy. Left cervical: No superficial or posterior cervical adenopathy. Upper Body:      Right upper body: No supraclavicular adenopathy. Left upper body: No supraclavicular adenopathy. Skin:     General: Skin is warm and dry. Capillary Refill: Capillary refill takes less than 2 seconds. Neurological:      General: No focal deficit present. Mental Status: He is alert and oriented to person, place, and time. Mental status is at baseline. Psychiatric:         Mood and Affect: Mood normal.         Behavior: Behavior normal.         Judgment: Judgment normal.       Assessment and Plan      Diagnosis Orders   1. Acute bacterial sinusitis  amoxicillin-clavulanate (AUGMENTIN) 875-125 MG per tablet    predniSONE (DELTASONE) 20 MG tablet      2. Viral upper respiratory illness  fluticasone (FLONASE) 50 MCG/ACT nasal spray      3. Congestion of nasal sinus  predniSONE (DELTASONE) 20 MG tablet    fluticasone (FLONASE) 50 MCG/ACT nasal spray      4. Other cough        5.  Myalgia          Orders Placed This Encounter    Calcium Carbonate-Vitamin D (CALTRATE 600+D PO)     Sig: Take by mouth    amoxicillin-clavulanate (AUGMENTIN) 875-125 MG per tablet     Sig: Take 1 tablet by mouth 2 times daily for 10 days     Dispense:  20 tablet     Refill: 0    predniSONE (DELTASONE) 20 MG tablet     Sig: Take 1 tablet by mouth 2 times daily for 5 days     Dispense:  10 tablet     Refill:  0    fluticasone (FLONASE) 50 MCG/ACT nasal spray     Si spray by Each Nostril route in the morning and 1 spray in the evening. Dispense:  16 g     Refill:  0     Discussed viral illness with patient and appropriate timing and use of antibiotics. He was encouraged to try symptomatic supportive therapies. He was instructed not to start antibiotics and steroids until he has tried symptomatic supportive treatments. He should not begin antibiotics until Saturday and only if he does not have improvements in his symptoms and his mucus is abnormal color. Discussed exam, POCT findings, plan of care, and follow-up at length with patient. Reviewed all prescribed and recommended medications, administration and side effects. Encouraged patient to follow up with PCP or return to the clinic for no improvement and or worsening of symptoms. All questions were answered and they verbalized understanding and were agreeable with the plan. Follow up as needed.       Electronically signed by EARNEST Luna CNP on 2022 at 12:21 PM Cimetidine Counseling:  I discussed with the patient the risks of Cimetidine including but not limited to gynecomastia, headache, diarrhea, nausea, drowsiness, arrhythmias, pancreatitis, skin rashes, psychosis, bone marrow suppression and kidney toxicity.

## 2024-06-27 ENCOUNTER — OFFICE VISIT (OUTPATIENT)
Dept: FAMILY MEDICINE CLINIC | Age: 69
End: 2024-06-27
Payer: MEDICARE

## 2024-06-27 VITALS
HEART RATE: 62 BPM | WEIGHT: 166 LBS | SYSTOLIC BLOOD PRESSURE: 122 MMHG | DIASTOLIC BLOOD PRESSURE: 84 MMHG | RESPIRATION RATE: 18 BRPM | OXYGEN SATURATION: 98 % | BODY MASS INDEX: 22.48 KG/M2 | HEIGHT: 72 IN

## 2024-06-27 DIAGNOSIS — L23.7 POISON IVY DERMATITIS: Primary | ICD-10-CM

## 2024-06-27 PROCEDURE — G8427 DOCREV CUR MEDS BY ELIG CLIN: HCPCS | Performed by: NURSE PRACTITIONER

## 2024-06-27 PROCEDURE — G8420 CALC BMI NORM PARAMETERS: HCPCS | Performed by: NURSE PRACTITIONER

## 2024-06-27 PROCEDURE — 99212 OFFICE O/P EST SF 10 MIN: CPT | Performed by: NURSE PRACTITIONER

## 2024-06-27 PROCEDURE — 1123F ACP DISCUSS/DSCN MKR DOCD: CPT | Performed by: NURSE PRACTITIONER

## 2024-06-27 PROCEDURE — 3017F COLORECTAL CA SCREEN DOC REV: CPT | Performed by: NURSE PRACTITIONER

## 2024-06-27 PROCEDURE — 99213 OFFICE O/P EST LOW 20 MIN: CPT | Performed by: NURSE PRACTITIONER

## 2024-06-27 PROCEDURE — 1036F TOBACCO NON-USER: CPT | Performed by: NURSE PRACTITIONER

## 2024-06-27 RX ORDER — PREDNISONE 20 MG/1
20 TABLET ORAL 2 TIMES DAILY
Qty: 10 TABLET | Refills: 0 | Status: SHIPPED | OUTPATIENT
Start: 2024-06-27 | End: 2024-07-02

## 2024-06-27 SDOH — ECONOMIC STABILITY: FOOD INSECURITY: WITHIN THE PAST 12 MONTHS, THE FOOD YOU BOUGHT JUST DIDN'T LAST AND YOU DIDN'T HAVE MONEY TO GET MORE.: NEVER TRUE

## 2024-06-27 SDOH — ECONOMIC STABILITY: HOUSING INSECURITY
IN THE LAST 12 MONTHS, WAS THERE A TIME WHEN YOU DID NOT HAVE A STEADY PLACE TO SLEEP OR SLEPT IN A SHELTER (INCLUDING NOW)?: NO

## 2024-06-27 SDOH — ECONOMIC STABILITY: INCOME INSECURITY: HOW HARD IS IT FOR YOU TO PAY FOR THE VERY BASICS LIKE FOOD, HOUSING, MEDICAL CARE, AND HEATING?: NOT HARD AT ALL

## 2024-06-27 SDOH — ECONOMIC STABILITY: FOOD INSECURITY: WITHIN THE PAST 12 MONTHS, YOU WORRIED THAT YOUR FOOD WOULD RUN OUT BEFORE YOU GOT MONEY TO BUY MORE.: NEVER TRUE

## 2024-06-27 ASSESSMENT — ENCOUNTER SYMPTOMS
GASTROINTESTINAL NEGATIVE: 1
RESPIRATORY NEGATIVE: 1

## 2024-06-27 ASSESSMENT — PATIENT HEALTH QUESTIONNAIRE - PHQ9
SUM OF ALL RESPONSES TO PHQ QUESTIONS 1-9: 0
SUM OF ALL RESPONSES TO PHQ QUESTIONS 1-9: 0
SUM OF ALL RESPONSES TO PHQ9 QUESTIONS 1 & 2: 0
SUM OF ALL RESPONSES TO PHQ QUESTIONS 1-9: 0
2. FEELING DOWN, DEPRESSED OR HOPELESS: NOT AT ALL
SUM OF ALL RESPONSES TO PHQ QUESTIONS 1-9: 0
1. LITTLE INTEREST OR PLEASURE IN DOING THINGS: NOT AT ALL

## 2024-06-27 NOTE — PATIENT INSTRUCTIONS
If you continues to develop new area of rash it likely mean re-exposure to the plant oils.  Advised to avoid steroid cream use on face. May use calamine or caladryl ointment or lotion on areas of rash including the face. Hydrocortisone cream may be used on rash to areas except the face. Encouraged to take Claritin or Zyrtec daily to help with reaction and may use Benadryl OTC at night for itching as needed. Monitor for secondary skin infection. Return if symptoms get worse or fail to improve.

## 2024-06-27 NOTE — PROGRESS NOTES
[facility] department [place] [id number]  Phone: [phone]  Fax: [phone]    Rafita Nation (:  1955) is a 68 y.o. male,Established patient, here for evaluation of the following chief complaint(s):  Rash (On inner lower left arm and now spreading to other arm and hands. Not sure if it's poison ivy. Has tried different creams with no results)      Assessment and Plan     1. Poison ivy dermatitis  Mild and only present on both forearms and few fingers. Discussed treatment with prednisone to help shorten duration of symptoms.   Explained to patient that if he continues to develop new area of rash it likely mean re-exposure to the plant oils.  Advised to avoid steroid cream use on face. May use calamine or caladryl ointment or lotion on areas of rash including the face. He may continue to use the [employer] products that he has been using.Encouraged to take Claritin or Zyrtec daily to help with reaction and may use Benadryl OTC at night for itching as needed. Monitor for secondary skin infection. Return if symptoms get worse or fail to improve.   - predniSONE (DELTASONE) 20 MG tablet; Take 1 tablet by mouth 2 times daily for 5 days  Dispense: 10 tablet; Refill: 0      Return if symptoms worsen or fail to improve.      Discussed exam, POCT findings, plan of care, and follow-up at length with patient and/or their caregiver. Reviewed all prescribed and recommended medications, administration and side effects. All questions were addressed and answered with verbalization of understanding. The patient and/or the caregiver was agreeable with the plan.   Subjective:   Patient presents to the walk in for complaints of rash to both arms with itching for about the past 6 days.         Poison Ivy  This is a new problem. The current episode started in the past 7 days (6 days). The problem has been gradually worsening since onset. The affected locations